# Patient Record
Sex: FEMALE | NOT HISPANIC OR LATINO
[De-identification: names, ages, dates, MRNs, and addresses within clinical notes are randomized per-mention and may not be internally consistent; named-entity substitution may affect disease eponyms.]

---
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
## 2019-02-12 ENCOUNTER — RECORD ABSTRACTING (OUTPATIENT)
Age: 24
End: 2019-02-12

## 2019-02-12 PROBLEM — Z00.00 ENCOUNTER FOR PREVENTIVE HEALTH EXAMINATION: Status: ACTIVE | Noted: 2019-02-12

## 2022-05-18 ENCOUNTER — APPOINTMENT (OUTPATIENT)
Dept: ORTHOPEDIC SURGERY | Facility: CLINIC | Age: 27
End: 2022-05-18
Payer: OTHER MISCELLANEOUS

## 2022-05-18 VITALS — BODY MASS INDEX: 33.66 KG/M2 | WEIGHT: 190 LBS | HEIGHT: 63 IN

## 2022-05-18 DIAGNOSIS — Z78.9 OTHER SPECIFIED HEALTH STATUS: ICD-10-CM

## 2022-05-18 DIAGNOSIS — S92.354A NONDISPLACED FRACTURE OF FIFTH METATARSAL BONE, RIGHT FOOT, INITIAL ENCOUNTER FOR CLOSED FRACTURE: ICD-10-CM

## 2022-05-18 PROCEDURE — 99072 ADDL SUPL MATRL&STAF TM PHE: CPT

## 2022-05-18 PROCEDURE — L4361: CPT

## 2022-05-18 PROCEDURE — 99214 OFFICE O/P EST MOD 30 MIN: CPT | Mod: 57

## 2022-05-18 PROCEDURE — 28470 CLTX METATARSAL FX WO MNP EA: CPT

## 2022-05-18 NOTE — ASSESSMENT
[FreeTextEntry1] : WBAT in CAM boot.\par Ice to affected area.\par Elevation encouraged.\par \par Patient was instructed that they can not operate an automatic vehicle while wearing a CAM boot or cast on the right lower extremity. If operating a vehicle that requires use of a clutch, patient may not drive while wearing a CAM boot or cast on the left lower extremity.\par \par Repeat x-ray will be performed at the next office visit.\par

## 2022-05-18 NOTE — DATA REVIEWED
[Outside X-rays] : outside x-rays [Right] : of the right [Foot] : foot [I independently reviewed and interpreted images and report] : I independently reviewed and interpreted images and report [FreeTextEntry1] : Non-displaced fracture base of 5th metatarsal.

## 2022-05-18 NOTE — HISTORY OF PRESENT ILLNESS
[7] : 7 [4] : 4 [Dull/Aching] : dull/aching [Localized] : localized [Not working due to injury] : Work status: not working due to injury [de-identified] :  5/17/22:\par Patient presents for evaluation of her RT foot. Reports twisting injury while at work. Evaluated at [facility] and diagnosed with 5th met base fx. NWB in splint, using crutches. She is s/p orif of her right bimalleolar equivalent ankle fracture with syndesmotic fixation on 4/20/20. Reports ankle has been doing well. She is currently OOW as a teacher.  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

## 2022-05-18 NOTE — WORK
[Fracture] : fracture [Was the competent medical cause of the injury] : was the competent medical cause of the injury [Are consistent with the injury] : are consistent with the injury [Consistent with my objective findings] : consistent with my objective findings [Total] : total [Does not reveal pre-existing condition(s) that may affect treatment/prognosis] : does not reveal pre-existing condition(s) that may affect treatment/prognosis [Other: ___] : [unfilled] [Cannot return to work because ________] : cannot return to work because [unfilled] [N/A] : : Not Applicable [Patient] : patient [No Rx restrictions] : No Rx restrictions. [I provided the services listed above] :  I provided the services listed above. [FreeTextEntry1] : good

## 2022-05-18 NOTE — RETURN TO WORK/SCHOOL
[FreeTextEntry1] : Pt. is unable to work at this time due the nature of their injury. This will be reevaluated at next office visit.\par

## 2022-05-18 NOTE — PHYSICAL EXAM
[Right] : right foot and ankle [NL (40)] : plantar flexion 40 degrees [NL 30)] : inversion 30 degrees [NL (20)] : eversion 20 degrees [5___] : [facility] 5[unfilled]/5 [2+] : posterior tibialis pulse: 2+ [Normal] : saphenous nerve sensation normal [Moderate] : moderate swelling of lateral foot [4th] : 4th [5th] : 5th [4___] : eversion 4[unfilled]/5 [] : no pain when stressing lateral tarsal metatarsal joint [de-identified] : inversion 10 degrees [de-identified] : eversion 10 degrees [TWNoteComboBox7] : dorsiflexion 10 degrees

## 2022-06-03 ENCOUNTER — APPOINTMENT (OUTPATIENT)
Dept: ORTHOPEDIC SURGERY | Facility: CLINIC | Age: 27
End: 2022-06-03
Payer: OTHER MISCELLANEOUS

## 2022-06-03 PROCEDURE — 73630 X-RAY EXAM OF FOOT: CPT | Mod: RT

## 2022-06-03 PROCEDURE — 99024 POSTOP FOLLOW-UP VISIT: CPT

## 2022-06-03 NOTE — WORK
[Total] : total [Does not reveal pre-existing condition(s) that may affect treatment/prognosis] : does not reveal pre-existing condition(s) that may affect treatment/prognosis [Other: ___] : [unfilled] [Cannot return to work because ________] : cannot return to work because [unfilled] [N/A] : : Not Applicable [Patient] : patient [No Rx restrictions] : No Rx restrictions. [I provided the services listed above] :  I provided the services listed above. [FreeTextEntry1] : good

## 2022-06-03 NOTE — ASSESSMENT
[FreeTextEntry1] : Continue WBAT in CAM boot.\par Ice to affected area.\par Elevation encouraged.\par \par Patient was instructed that they can not operate an automatic vehicle while wearing a CAM boot or cast on the right lower extremity. If operating a vehicle that requires use of a clutch, patient may not drive while wearing a CAM boot or cast on the left lower extremity.\par \par Repeat x-ray will be performed at the next office visit.\par

## 2022-06-03 NOTE — PHYSICAL EXAM
[4th] : 4th [5th] : 5th [NL (40)] : plantar flexion 40 degrees [NL 30)] : inversion 30 degrees [NL (20)] : eversion 20 degrees [4___] : eversion 4[unfilled]/5 [5___] : [facility] 5[unfilled]/5 [2+] : posterior tibialis pulse: 2+ [Normal] : saphenous nerve sensation normal [Mild] : mild swelling of lateral foot [] : no pain when stressing lateral tarsal metatarsal joint [Right] : right foot [The fracture is in acceptable alignment. There is progression in healing seen] : The fracture is in acceptable alignment. There is progression in healing seen [de-identified] : WB in CAM boot.  [de-identified] : inversion 0 degrees [de-identified] : eversion 10 degrees [TWNoteComboBox7] : dorsiflexion 10 degrees

## 2022-06-03 NOTE — RETURN TO WORK/SCHOOL
[FreeTextEntry1] : The patient is unable to work at this time. She has been unable to work since 5/17/22 on the date of injury. This will be reevaluated at their next office visit.\par

## 2022-06-03 NOTE — HISTORY OF PRESENT ILLNESS
[7] : 7 [4] : 4 [Dull/Aching] : dull/aching [Localized] : localized [Not working due to injury] : Work status: not working due to injury [de-identified] :  5/17/22:\par Patient returns for F/U of her RT 5th metatarsal after twisting injury while at work. She continues to WB in tall CAM. She is s/p orif of her right bimalleolar equivalent ankle fracture with syndesmotic fixation on 4/20/20. Reports ankle has been doing well. She is currently OOW as a teacher.  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

## 2022-07-01 ENCOUNTER — APPOINTMENT (OUTPATIENT)
Dept: ORTHOPEDIC SURGERY | Facility: CLINIC | Age: 27
End: 2022-07-01

## 2022-07-01 PROCEDURE — 99024 POSTOP FOLLOW-UP VISIT: CPT

## 2022-07-01 PROCEDURE — 73630 X-RAY EXAM OF FOOT: CPT | Mod: RT

## 2022-07-01 NOTE — HISTORY OF PRESENT ILLNESS
[Dull/Aching] : dull/aching [Localized] : localized [Not working due to injury] : Work status: not working due to injury [5] : 5 [3] : 3 [Household chores] : household chores [Leisure] : leisure [Work] : work [Social interactions] : social interactions [Rest] : rest [Sitting] : sitting [Standing] : standing [Walking] : walking [Stairs] : stairs [de-identified] :  5/17/22:\par Patient returns for F/U of her RT 5th metatarsal after twisting injury while at work. She continues to WB in tall CAM. She is s/p orif of her right bimalleolar equivalent ankle fracture with syndesmotic fixation on 4/20/20. Reports ankle has been doing well. She is currently OOW as a teacher.  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

## 2022-07-01 NOTE — PHYSICAL EXAM
[Mild] : mild swelling of lateral foot [NL (40)] : plantar flexion 40 degrees [NL 30)] : inversion 30 degrees [2+] : posterior tibialis pulse: 2+ [Normal] : saphenous nerve sensation normal [Right] : right foot [The fracture is in acceptable alignment. There is progression in healing seen] : The fracture is in acceptable alignment. There is progression in healing seen [5___] : eversion 5[unfilled]/5 [] : no pain with resisted dorsiflexion from inverted position [de-identified] : WB in CAM boot.  [de-identified] : eversion 15 degrees [TWNoteComboBox7] : dorsiflexion 15 degrees

## 2022-07-01 NOTE — ASSESSMENT
[FreeTextEntry1] : D/C CAM boot.\par WBAT in supportive footwear.\par Ice to affected area. \par No high impact activities. \par \par Repeat x-ray will be performed at the next office visit.\par

## 2022-08-05 ENCOUNTER — APPOINTMENT (OUTPATIENT)
Dept: ORTHOPEDIC SURGERY | Facility: CLINIC | Age: 27
End: 2022-08-05

## 2022-08-05 DIAGNOSIS — S92.354D NONDISPLACED FRACTURE OF FIFTH METATARSAL BONE, RIGHT FOOT, SUBSEQUENT ENCOUNTER FOR FRACTURE WITH ROUTINE HEALING: ICD-10-CM

## 2022-08-05 PROCEDURE — 73630 X-RAY EXAM OF FOOT: CPT | Mod: LT

## 2022-08-05 PROCEDURE — 99024 POSTOP FOLLOW-UP VISIT: CPT

## 2022-08-05 NOTE — HISTORY OF PRESENT ILLNESS
[5] : 5 [3] : 3 [Dull/Aching] : dull/aching [Localized] : localized [Household chores] : household chores [Leisure] : leisure [Work] : work [Social interactions] : social interactions [Rest] : rest [Sitting] : sitting [Standing] : standing [Walking] : walking [Stairs] : stairs [Not working due to injury] : Work status: not working due to injury [de-identified] :  5/17/22::  Patient returns for F/U of her RT 5th metatarsal after twisting injury while at work. She continues to WB in regular shoes and reports no pain.. She is s/p orif of her right bimalleolar equivalent ankle fracture with syndesmotic fixation on 4/20/20. Reports ankle has been doing well. She is currently OOW as a teacher.  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

## 2022-08-05 NOTE — ASSESSMENT
[FreeTextEntry1] : Patient has recovered well, and can slowly resume activities as tolerated.  Home stretching exercises were encouraged to maintain flexibility. Ice/nsaids can be used as needed.\par \par

## 2022-08-05 NOTE — PHYSICAL EXAM
[NL (40)] : plantar flexion 40 degrees [NL 30)] : inversion 30 degrees [5___] : [facility] 5[unfilled]/5 [2+] : posterior tibialis pulse: 2+ [Normal] : saphenous nerve sensation normal [Right] : right foot [The fracture is in acceptable alignment. There is progression in healing seen] : The fracture is in acceptable alignment. There is progression in healing seen [NL (20)] : eversion 20 degrees [] : no pain when stressing lateral tarsal metatarsal joint [de-identified] : Healed 5th metatarsal base fracture [de-identified] : eversion 15 degrees [TWNoteComboBox7] : dorsiflexion 15 degrees

## 2024-11-22 ENCOUNTER — NON-APPOINTMENT (OUTPATIENT)
Age: 29
End: 2024-11-22

## 2025-05-28 ENCOUNTER — NON-APPOINTMENT (OUTPATIENT)
Age: 30
End: 2025-05-28